# Patient Record
Sex: MALE | Race: WHITE | NOT HISPANIC OR LATINO | Employment: FULL TIME | URBAN - METROPOLITAN AREA
[De-identification: names, ages, dates, MRNs, and addresses within clinical notes are randomized per-mention and may not be internally consistent; named-entity substitution may affect disease eponyms.]

---

## 2023-08-28 ENCOUNTER — OFFICE VISIT (OUTPATIENT)
Dept: PAIN MEDICINE | Facility: CLINIC | Age: 38
End: 2023-08-28
Payer: COMMERCIAL

## 2023-08-28 VITALS
BODY MASS INDEX: 23.4 KG/M2 | HEIGHT: 69 IN | WEIGHT: 158 LBS | HEART RATE: 92 BPM | DIASTOLIC BLOOD PRESSURE: 87 MMHG | SYSTOLIC BLOOD PRESSURE: 129 MMHG

## 2023-08-28 DIAGNOSIS — M54.16 LUMBAR RADICULITIS: ICD-10-CM

## 2023-08-28 DIAGNOSIS — M47.896 OTHER SPONDYLOSIS, LUMBAR REGION: ICD-10-CM

## 2023-08-28 DIAGNOSIS — M51.36 DDD (DEGENERATIVE DISC DISEASE), LUMBAR: ICD-10-CM

## 2023-08-28 DIAGNOSIS — M54.2 CERVICALGIA: Primary | ICD-10-CM

## 2023-08-28 PROCEDURE — 99999 PR PBB SHADOW E&M-NEW PATIENT-LVL IV: ICD-10-PCS | Mod: PBBFAC,,, | Performed by: ANESTHESIOLOGY

## 2023-08-28 PROCEDURE — 99204 OFFICE O/P NEW MOD 45 MIN: CPT | Mod: S$GLB,,, | Performed by: ANESTHESIOLOGY

## 2023-08-28 PROCEDURE — 99999 PR PBB SHADOW E&M-NEW PATIENT-LVL IV: CPT | Mod: PBBFAC,,, | Performed by: ANESTHESIOLOGY

## 2023-08-28 PROCEDURE — 99204 PR OFFICE/OUTPT VISIT, NEW, LEVL IV, 45-59 MIN: ICD-10-PCS | Mod: S$GLB,,, | Performed by: ANESTHESIOLOGY

## 2023-08-28 RX ORDER — DICLOFENAC SODIUM 10 MG/G
GEL TOPICAL
COMMUNITY
Start: 2023-08-21 | End: 2024-01-23 | Stop reason: CLARIF

## 2023-08-28 RX ORDER — ACETAMINOPHEN 500 MG
500 TABLET ORAL
COMMUNITY
End: 2024-01-23 | Stop reason: CLARIF

## 2023-08-28 RX ORDER — DULOXETIN HYDROCHLORIDE 20 MG/1
20 CAPSULE, DELAYED RELEASE ORAL
COMMUNITY
Start: 2022-12-07 | End: 2024-01-23 | Stop reason: CLARIF

## 2023-08-28 RX ORDER — AMOXICILLIN AND CLAVULANATE POTASSIUM 875; 125 MG/1; MG/1
1 TABLET, FILM COATED ORAL EVERY 12 HOURS
COMMUNITY
Start: 2023-08-21 | End: 2024-01-23 | Stop reason: CLARIF

## 2023-08-28 RX ORDER — ALBUTEROL SULFATE 90 UG/1
2 AEROSOL, METERED RESPIRATORY (INHALATION) EVERY 6 HOURS PRN
COMMUNITY
Start: 2022-10-12 | End: 2024-01-23 | Stop reason: CLARIF

## 2023-08-28 RX ORDER — IBUPROFEN 200 MG
1 TABLET ORAL
COMMUNITY
End: 2024-01-23 | Stop reason: CLARIF

## 2023-08-28 RX ORDER — FAMOTIDINE 20 MG/1
20 TABLET, FILM COATED ORAL
COMMUNITY
Start: 2022-12-07 | End: 2024-01-23 | Stop reason: CLARIF

## 2023-08-28 RX ORDER — LORATADINE 10 MG/1
10 TABLET ORAL
COMMUNITY
End: 2024-01-23 | Stop reason: CLARIF

## 2023-08-28 RX ORDER — METHOCARBAMOL 500 MG/1
500 TABLET, FILM COATED ORAL 4 TIMES DAILY
COMMUNITY
End: 2024-01-23 | Stop reason: CLARIF
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
## 2023-09-18 ENCOUNTER — TELEPHONE (OUTPATIENT)
Dept: PAIN MEDICINE | Facility: CLINIC | Age: 38
End: 2023-09-18
Payer: COMMERCIAL

## 2023-09-18 DIAGNOSIS — M51.36 DDD (DEGENERATIVE DISC DISEASE), LUMBAR: Primary | ICD-10-CM

## 2023-09-18 NOTE — TELEPHONE ENCOUNTER
----- Message from Joanna Hutchinson sent at 9/18/2023 11:06 AM CDT -----  Contact: pt wife anthnoy  Type: Needs Medical Advice  Who Called:  pt wife anthony  Best Call Back Number: [phone]  Additional Information: please call anthony has some questions regarding the pt referral

## 2023-09-18 NOTE — TELEPHONE ENCOUNTER
Spoke with pt wife can you please specify location [place] or [facility]  thank you

## 2023-09-29 ENCOUNTER — CLINICAL SUPPORT (OUTPATIENT)
Dept: REHABILITATION | Facility: HOSPITAL | Age: 38
End: 2023-09-29
Attending: ANESTHESIOLOGY
Payer: COMMERCIAL

## 2023-09-29 DIAGNOSIS — M51.36 DDD (DEGENERATIVE DISC DISEASE), LUMBAR: ICD-10-CM

## 2023-09-29 DIAGNOSIS — M54.50 CHRONIC BILATERAL LOW BACK PAIN WITHOUT SCIATICA: ICD-10-CM

## 2023-09-29 DIAGNOSIS — R29.898 IMPAIRED FLEXIBILITY OF LOWER EXTREMITY: ICD-10-CM

## 2023-09-29 DIAGNOSIS — G89.29 CHRONIC BILATERAL LOW BACK PAIN WITHOUT SCIATICA: ICD-10-CM

## 2023-09-29 DIAGNOSIS — M62.81 WEAKNESS OF TRUNK MUSCULATURE: ICD-10-CM

## 2023-09-29 PROCEDURE — 97530 THERAPEUTIC ACTIVITIES: CPT | Mod: PN

## 2023-09-29 PROCEDURE — 97162 PT EVAL MOD COMPLEX 30 MIN: CPT | Mod: PN
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
## 2023-09-30 PROBLEM — M62.81 WEAKNESS OF TRUNK MUSCULATURE: Status: ACTIVE | Noted: 2023-09-30

## 2023-09-30 PROBLEM — G89.29 CHRONIC BILATERAL LOW BACK PAIN WITHOUT SCIATICA: Status: ACTIVE | Noted: 2023-09-30

## 2023-09-30 PROBLEM — M54.50 CHRONIC BILATERAL LOW BACK PAIN WITHOUT SCIATICA: Status: ACTIVE | Noted: 2023-09-30

## 2023-09-30 PROBLEM — R29.898 IMPAIRED FLEXIBILITY OF LOWER EXTREMITY: Status: ACTIVE | Noted: 2023-09-30

## 2023-10-09 ENCOUNTER — OFFICE VISIT (OUTPATIENT)
Dept: PAIN MEDICINE | Facility: CLINIC | Age: 38
End: 2023-10-09
Payer: COMMERCIAL

## 2023-10-09 VITALS
DIASTOLIC BLOOD PRESSURE: 94 MMHG | HEIGHT: 69 IN | BODY MASS INDEX: 23.4 KG/M2 | SYSTOLIC BLOOD PRESSURE: 145 MMHG | HEART RATE: 71 BPM | WEIGHT: 158 LBS

## 2023-10-09 DIAGNOSIS — M54.2 CERVICALGIA: ICD-10-CM

## 2023-10-09 DIAGNOSIS — M54.16 LUMBAR RADICULITIS: ICD-10-CM

## 2023-10-09 DIAGNOSIS — M51.36 DDD (DEGENERATIVE DISC DISEASE), LUMBAR: Primary | ICD-10-CM

## 2023-10-09 DIAGNOSIS — M47.896 OTHER SPONDYLOSIS, LUMBAR REGION: ICD-10-CM

## 2023-10-09 PROCEDURE — 99214 PR OFFICE/OUTPT VISIT, EST, LEVL IV, 30-39 MIN: ICD-10-PCS | Mod: S$GLB,,, | Performed by: PHYSICIAN ASSISTANT

## 2023-10-09 PROCEDURE — 99999 PR PBB SHADOW E&M-EST. PATIENT-LVL III: CPT | Mod: PBBFAC,,, | Performed by: PHYSICIAN ASSISTANT

## 2023-10-09 PROCEDURE — 99999 PR PBB SHADOW E&M-EST. PATIENT-LVL III: ICD-10-PCS | Mod: PBBFAC,,, | Performed by: PHYSICIAN ASSISTANT

## 2023-10-09 PROCEDURE — 99214 OFFICE O/P EST MOD 30 MIN: CPT | Mod: S$GLB,,, | Performed by: PHYSICIAN ASSISTANT

## 2023-10-09 RX ORDER — GABAPENTIN 300 MG/1
300 CAPSULE ORAL 3 TIMES DAILY
Qty: 90 CAPSULE | Refills: 0 | Status: SHIPPED | OUTPATIENT
Start: 2023-10-09 | End: 2023-11-30 | Stop reason: SDUPTHER
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
## 2023-11-30 RX ORDER — GABAPENTIN 300 MG/1
300 CAPSULE ORAL 3 TIMES DAILY
Qty: 90 CAPSULE | Refills: 0 | Status: SHIPPED | OUTPATIENT
Start: 2023-11-30 | End: 2024-01-14 | Stop reason: SDUPTHER

## 2023-11-30 NOTE — TELEPHONE ENCOUNTER
----- Message from Debo Copeland sent at 11/30/2023 12:07 PM CST -----  Contact: Patient wife  Type:  Needs Medical Advice    Who Called: Patient's wifeMakayla     Would the patient rather a call back or a response via [facility]? Call     Best Call Back Number:  [phone]    Additional Information: Patient's wifemakayla would like to speak with the nurse in regards to medication.     Please call to advise

## 2023-12-11 ENCOUNTER — OFFICE VISIT (OUTPATIENT)
Dept: PAIN MEDICINE | Facility: CLINIC | Age: 38
End: 2023-12-11
Payer: COMMERCIAL

## 2023-12-11 ENCOUNTER — HOSPITAL ENCOUNTER (OUTPATIENT)
Dept: RADIOLOGY | Facility: HOSPITAL | Age: 38
Discharge: HOME | End: 2023-12-11
Attending: PHYSICIAN ASSISTANT
Payer: COMMERCIAL

## 2023-12-11 VITALS
WEIGHT: 158 LBS | SYSTOLIC BLOOD PRESSURE: 152 MMHG | HEART RATE: 61 BPM | DIASTOLIC BLOOD PRESSURE: 91 MMHG | BODY MASS INDEX: 23.4 KG/M2 | HEIGHT: 69 IN

## 2023-12-11 DIAGNOSIS — M54.50 CHRONIC MIDLINE LOW BACK PAIN WITHOUT SCIATICA: ICD-10-CM

## 2023-12-11 DIAGNOSIS — M47.896 OTHER SPONDYLOSIS, LUMBAR REGION: ICD-10-CM

## 2023-12-11 DIAGNOSIS — G89.29 CHRONIC MIDLINE LOW BACK PAIN WITHOUT SCIATICA: ICD-10-CM

## 2023-12-11 DIAGNOSIS — Z98.1 HISTORY OF LUMBAR FUSION: ICD-10-CM

## 2023-12-11 DIAGNOSIS — M54.9 DORSALGIA, UNSPECIFIED: ICD-10-CM

## 2023-12-11 DIAGNOSIS — Z98.1 HISTORY OF LUMBAR FUSION: Primary | ICD-10-CM

## 2023-12-11 DIAGNOSIS — M51.36 DDD (DEGENERATIVE DISC DISEASE), LUMBAR: ICD-10-CM

## 2023-12-11 PROCEDURE — 99999 PR PBB SHADOW E&M-EST. PATIENT-LVL IV: CPT | Mod: PBBFAC,,, | Performed by: PHYSICIAN ASSISTANT

## 2023-12-11 PROCEDURE — 99214 OFFICE O/P EST MOD 30 MIN: CPT | Mod: S$GLB,,, | Performed by: PHYSICIAN ASSISTANT

## 2023-12-11 PROCEDURE — 99999 PR PBB SHADOW E&M-EST. PATIENT-LVL IV: ICD-10-PCS | Mod: PBBFAC,,, | Performed by: PHYSICIAN ASSISTANT

## 2023-12-11 PROCEDURE — 72114 X-RAY EXAM L-S SPINE BENDING: CPT | Mod: TC

## 2023-12-11 PROCEDURE — 72114 X-RAY EXAM L-S SPINE BENDING: CPT | Mod: 26,,, | Performed by: RADIOLOGY

## 2023-12-11 PROCEDURE — 72114 XR LUMBAR SPINE 5 VIEW WITH FLEX AND EXT: ICD-10-PCS | Mod: 26,,, | Performed by: RADIOLOGY

## 2023-12-11 PROCEDURE — 99214 PR OFFICE/OUTPT VISIT, EST, LEVL IV, 30-39 MIN: ICD-10-PCS | Mod: S$GLB,,, | Performed by: PHYSICIAN ASSISTANT

## 2023-12-11 RX ORDER — IBUPROFEN 800 MG/1
800 TABLET ORAL 2 TIMES DAILY
Qty: 60 TABLET | Refills: 0 | Status: SHIPPED | OUTPATIENT
Start: 2023-12-11 | End: 2024-01-14 | Stop reason: SDUPTHER
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
## 2023-12-27 ENCOUNTER — HOSPITAL ENCOUNTER (OUTPATIENT)
Dept: RADIOLOGY | Facility: HOSPITAL | Age: 38
Discharge: HOME | End: 2023-12-27
Attending: PHYSICIAN ASSISTANT
Payer: COMMERCIAL

## 2023-12-27 DIAGNOSIS — M54.9 DORSALGIA, UNSPECIFIED: ICD-10-CM

## 2023-12-27 DIAGNOSIS — Z98.1 HISTORY OF LUMBAR FUSION: ICD-10-CM

## 2023-12-27 DIAGNOSIS — M54.50 CHRONIC MIDLINE LOW BACK PAIN WITHOUT SCIATICA: ICD-10-CM

## 2023-12-27 DIAGNOSIS — G89.29 CHRONIC MIDLINE LOW BACK PAIN WITHOUT SCIATICA: ICD-10-CM

## 2023-12-27 PROCEDURE — A9585 GADOBUTROL INJECTION: HCPCS

## 2023-12-27 PROCEDURE — 72158 MRI LUMBAR SPINE W/O & W/DYE: CPT | Mod: TC

## 2023-12-27 PROCEDURE — 72158 MRI LUMBAR SPINE W/O & W/DYE: CPT | Mod: 26,,, | Performed by: RADIOLOGY

## 2023-12-27 PROCEDURE — 25500020 PHARM REV CODE 255

## 2023-12-27 PROCEDURE — 72158 MRI LUMBAR SPINE W WO CONTRAST: ICD-10-PCS | Mod: 26,,, | Performed by: RADIOLOGY

## 2023-12-27 RX ORDER — GADOBUTROL 604.72 MG/ML
INJECTION INTRAVENOUS
Status: COMPLETED
Start: 2023-12-27 | End: 2023-12-27

## 2023-12-27 RX ADMIN — GADOBUTROL 7 ML: 604.72 INJECTION INTRAVENOUS at 07:12

## 2023-12-29 ENCOUNTER — TELEPHONE (OUTPATIENT)
Dept: PAIN MEDICINE | Facility: CLINIC | Age: 38
End: 2023-12-29
Payer: COMMERCIAL

## 2023-12-29 NOTE — TELEPHONE ENCOUNTER
MRI showed narrowing in the spinal canal at multiple levels. Recommend trying SUSAN at L5-S1 to see if we can help his pain

## 2023-12-29 NOTE — TELEPHONE ENCOUNTER
----- Message from Rafaela Burton sent at 12/29/2023  4:15 PM CST -----  Regarding: results  Contact: anthony  Type:  Test Results    Who Called: anthony  Name of Test (Lab/Mammo/Etc): MRI  Date of Test: 12/27/23  Ordering Provider: To  Where the test was performed: [facility]  Would the patient rather a call back or a response via [facility]? Call back  Best Call Back Number: [phone]    Additional Information:  sts the pt would like his results

## 2024-01-02 NOTE — TELEPHONE ENCOUNTER
Felicity tried to contact him last week. Here is his MRI results and my recommendations.   MRI showed narrowing in the spinal canal at multiple levels. Recommend trying SUSAN at L5-S1 to see if we can help his pain

## 2024-01-04 DIAGNOSIS — M54.16 LUMBAR RADICULITIS: Primary | ICD-10-CM

## 2024-01-14 DIAGNOSIS — Z98.1 HISTORY OF LUMBAR FUSION: ICD-10-CM

## 2024-01-16 RX ORDER — IBUPROFEN 800 MG/1
800 TABLET ORAL 2 TIMES DAILY
Qty: 60 TABLET | Refills: 0 | Status: SHIPPED | OUTPATIENT
Start: 2024-01-16 | End: 2024-02-14

## 2024-01-16 RX ORDER — GABAPENTIN 300 MG/1
300 CAPSULE ORAL 3 TIMES DAILY
Qty: 90 CAPSULE | Refills: 0 | Status: SHIPPED | OUTPATIENT
Start: 2024-01-16 | End: 2024-04-15

## 2024-01-25 ENCOUNTER — HOSPITAL ENCOUNTER (OUTPATIENT)
Facility: HOSPITAL | Age: 39
Discharge: HOME | End: 2024-01-25
Attending: ANESTHESIOLOGY | Admitting: ANESTHESIOLOGY
Payer: COMMERCIAL

## 2024-01-25 DIAGNOSIS — M54.16 LUMBAR RADICULITIS: ICD-10-CM

## 2024-01-25 PROCEDURE — 25500020 PHARM REV CODE 255: Performed by: ANESTHESIOLOGY

## 2024-01-25 PROCEDURE — 62323 NJX INTERLAMINAR LMBR/SAC: CPT | Performed by: ANESTHESIOLOGY

## 2024-01-25 PROCEDURE — 25000003 PHARM REV CODE 250: Performed by: ANESTHESIOLOGY

## 2024-01-25 PROCEDURE — A4216 STERILE WATER/SALINE, 10 ML: HCPCS | Performed by: ANESTHESIOLOGY

## 2024-01-25 PROCEDURE — 63600175 PHARM REV CODE 636 W HCPCS: Performed by: ANESTHESIOLOGY

## 2024-01-25 PROCEDURE — 62323 NJX INTERLAMINAR LMBR/SAC: CPT | Mod: ,,, | Performed by: ANESTHESIOLOGY

## 2024-01-25 RX ORDER — LIDOCAINE HYDROCHLORIDE 10 MG/ML
1 INJECTION, SOLUTION EPIDURAL; INFILTRATION; INTRACAUDAL; PERINEURAL ONCE
Status: COMPLETED | OUTPATIENT
Start: 2024-01-25 | End: 2024-01-25

## 2024-01-25 RX ORDER — SODIUM CHLORIDE 9 MG/ML
INJECTION, SOLUTION INTRAMUSCULAR; INTRAVENOUS; SUBCUTANEOUS
Status: DISCONTINUED | OUTPATIENT
Start: 2024-01-25 | End: 2024-01-25 | Stop reason: HOSPADM

## 2024-01-25 RX ORDER — MIDAZOLAM HYDROCHLORIDE 1 MG/ML
INJECTION INTRAMUSCULAR; INTRAVENOUS
Status: DISCONTINUED | OUTPATIENT
Start: 2024-01-25 | End: 2024-01-25 | Stop reason: HOSPADM

## 2024-01-25 RX ORDER — DEXAMETHASONE SODIUM PHOSPHATE 10 MG/ML
INJECTION INTRAMUSCULAR; INTRAVENOUS
Status: DISCONTINUED | OUTPATIENT
Start: 2024-01-25 | End: 2024-01-25 | Stop reason: HOSPADM

## 2024-01-25 RX ORDER — LIDOCAINE HYDROCHLORIDE 10 MG/ML
INJECTION, SOLUTION EPIDURAL; INFILTRATION; INTRACAUDAL; PERINEURAL
Status: DISCONTINUED | OUTPATIENT
Start: 2024-01-25 | End: 2024-01-25 | Stop reason: HOSPADM

## 2024-01-25 RX ORDER — SODIUM CHLORIDE, SODIUM LACTATE, POTASSIUM CHLORIDE, CALCIUM CHLORIDE 600; 310; 30; 20 MG/100ML; MG/100ML; MG/100ML; MG/100ML
INJECTION, SOLUTION INTRAVENOUS CONTINUOUS
Status: ACTIVE | OUTPATIENT
Start: 2024-01-25

## 2024-01-25 RX ORDER — FENTANYL CITRATE 50 UG/ML
INJECTION, SOLUTION INTRAMUSCULAR; INTRAVENOUS
Status: DISCONTINUED | OUTPATIENT
Start: 2024-01-25 | End: 2024-01-25 | Stop reason: HOSPADM

## 2024-01-25 RX ADMIN — LIDOCAINE HYDROCHLORIDE 0.2 ML: 10 INJECTION, SOLUTION EPIDURAL; INFILTRATION; INTRACAUDAL; PERINEURAL at 07:01

## 2024-01-25 RX ADMIN — SODIUM CHLORIDE, POTASSIUM CHLORIDE, SODIUM LACTATE AND CALCIUM CHLORIDE: 600; 310; 30; 20 INJECTION, SOLUTION INTRAVENOUS at 07:01

## 2024-01-25 NOTE — OP NOTE
PROCEDURE DATE: 1/25/2024    Procedure:   Interlaminar epidural steroid injection at L5-S1 under fluoroscopic guidance.    Diagnosis: lUMBAR radiculitis  pOSTOP DIAGNOSIS: sAME    Physician: Valdemar Xiao M.D.    Medications injected:10 mg dexamethasone with 4 ml of preservative free NaCl    Local anesthetic injected:    Lidocaine 1% 2 ml total    Sedation Medications: RN IV sedation    Estimated blood loss:  None    Complications:  None    Technique:  Time-out taken to identify patient and procedure prior to starting the procedure.  With the patient laying in a prone position, the area was prepped and draped in the usual sterile fashion using ChloraPrep and a fenestrated drape.  After determining the target level with an AP fluoroscopic view, local anesthetic was given using a 25-gauge 1.5 inch needle by raising a wheal and then infiltrating toward the interlaminar entry space.  A 3.5inch 20-gauge Touhy needle was introduced under AP fluoroscopic guidance to the interlaminar space of L5-S1. Once the trajectory was established, the needle was visualized in the lateral view and advanced using loss of resistance technique. Once in the desired position, 1ml contrast was injected to confirm placement and there was no vascular uptake nor intrathecal spread.  The medication was then injected slowly. The patient tolerated the procedure well.      The patient was monitored after the procedure.   They were given post-procedure and discharge instructions to follow at home.  The patient was discharged in a stable condition.

## 2024-01-25 NOTE — H&P
"CC: low back pain    HPI: The patient is a 38 y.o. male with a history of low back pain here for SUSAN. There are no major changes in history and physical from 12/1/23 by Jeannette.    Past Medical History:   Diagnosis Date    Allergies     Back pain     General anesthetics causing adverse effect in therapeutic use     pt states last injection, got too much medicine and was hard to wake up    Heavy smoker     Unspecified chronic bronchitis        History reviewed. No pertinent surgical history.    History reviewed. No pertinent family history.    Social History     Socioeconomic History    Marital status:    Tobacco Use    Smoking status: Every Day     Types: Cigarettes       Current Facility-Administered Medications   Medication Dose Route Frequency Provider Last Rate Last Admin    dexAMETHasone sodium phos (PF) injection    PRN Valdemar Xiao MD   10 mg at 01/25/24 0722    iohexoL (OMNIPAQUE 300) injection    PRN Valdemar Xiao MD   5 mL at 01/25/24 0723    lactated ringers infusion   Intravenous Continuous Valdemar Xiao MD 25 mL/hr at 01/25/24 0720 New Bag at 01/25/24 0720    LIDOcaine (PF) 10 mg/ml (1%) injection    PRN Valdemar Xiao MD   5 mL at 01/25/24 0723    sodium chloride 0.9% injection    PRN Valdemar Xiao MD   4 mL at 01/25/24 0724       Review of patient's allergies indicates:   Allergen Reactions    Tramadol Itching and Other (See Comments)     Urinary retention      Bupropion Itching     ITCHING      Cinnamon Other (See Comments)     MAKES AGITATED     Codeine Itching    Levofloxacin Hives    Penicillins Itching     Patient states has taken Keflex without adverse reaction      Quetiapine Other (See Comments)     MAKES AGITATED     Sulfamethoxazole-trimethoprim Hives    Acetaminophen-codeine     Escitalopram Itching       Vitals:    01/22/24 0924 01/25/24 0706   BP:  (!) 150/88   Pulse:  68   Resp:  18   Temp:  98.1 °F (36.7 °C)   TempSrc:  Skin   SpO2:  96%   Weight: 71.7 kg (158 lb)    Height: 5' 9" " (1.753 m)        REVIEW OF SYSTEMS:     GENERAL: No weight loss, malaise or fevers.  HEENT:  No recent changes in vision or hearing  NECK: Negative for lumps, no difficulty with swallowing.  RESPIRATORY: Negative for cough, wheezing or shortness of breath, patient denies any recent URI.  CARDIOVASCULAR: Negative for chest pain, leg swelling or palpitations.  GI: Negative for abdominal discomfort, blood in stools or black stools or change in bowel habits.  MUSCULOSKELETAL: See HPI.  SKIN: Negative for lesions, rash, and itching.  PSYCH: No suicidal or homicidal ideations, no current mood disturbances.  HEMATOLOGY/LYMPHOLOGY: Negative for prolonged bleeding, bruising easily or swollen nodes. Patient is not currently taking any anti-coagulants  ENDO: No history of diabetes or thyroid dysfunction  NEURO: No history of syncope, paralysis, seizures or tremors.All other reviewed and negative other than HPI.    Physical exam:  Gen: A and O x3, pleasant, well-groomed  Skin: No rashes or obvious lesions  HEENT: PERRLA, no obvious deformities on ears or in canals. No thyroid masses, trachea midline, no palpable lymph nodes in neck, axilla.  CVS: Regular rate and rhythm, normal S1 and S2, no murmurs.  Resp: Clear to auscultation bilaterally.  Abdomen: Soft, NT/ND, normal bowel sounds present.  Musculoskeletal/Neuro: Moving all extremities    Assessment:  Lumbar radiculitis  -     Case Request Operating Room: Injection-steroid-epidural-lumbar    Other orders  -     dexAMETHasone sodium phos (PF) injection  -     iohexoL (OMNIPAQUE 300) injection  -     LIDOcaine (PF) 10 mg/ml (1%) injection  -     sodium chloride 0.9% injection  -     FL Fluoro for Pain Management; Standing          PLAN: SUSAN      This patient has been cleared for surgery in an ambulatory surgical facility    ASA 3,  Mallampatti Score 3  No history of anesthetic complications  Plan for RN IV sedation

## 2024-01-25 NOTE — DISCHARGE SUMMARY
[facility]  Discharge Note  Short Stay    Procedure(s) (LRB):  Injection-steroid-epidural-lumbar (N/A)      OUTCOME: Patient tolerated treatment/procedure well without complication and is now ready for discharge.    DISPOSITION: Home or Self Care    FINAL DIAGNOSIS:  <principal problem not specified>    FOLLOWUP: In clinic    DISCHARGE INSTRUCTIONS:    Discharge Procedure Orders   Notify your health care provider if you experience any of the following:  temperature >100.4     Notify your health care provider if you experience any of the following:  severe uncontrolled pain     Notify your health care provider if you experience any of the following:  redness, tenderness, or signs of infection (pain, swelling, redness, odor or green/yellow discharge around incision site)     Activity as tolerated        TIME SPENT ON DISCHARGE:   20 minutes

## 2024-01-25 NOTE — PLAN OF CARE
Discharge instructions given to pt/wife, verbalized understanding.  Refused fluids.  IV removed.  Pain 4/10.  Ambulating out to wife  per RN in no distress.

## 2024-01-26 VITALS
TEMPERATURE: 98 F | BODY MASS INDEX: 23.4 KG/M2 | RESPIRATION RATE: 20 BRPM | SYSTOLIC BLOOD PRESSURE: 136 MMHG | HEART RATE: 58 BPM | OXYGEN SATURATION: 96 % | HEIGHT: 69 IN | WEIGHT: 158 LBS | DIASTOLIC BLOOD PRESSURE: 94 MMHG

## 2024-02-22 ENCOUNTER — TELEPHONE (OUTPATIENT)
Dept: PAIN MEDICINE | Facility: CLINIC | Age: 39
End: 2024-02-22
Payer: COMMERCIAL

## 2024-02-22 ENCOUNTER — OFFICE VISIT (OUTPATIENT)
Dept: PAIN MEDICINE | Facility: CLINIC | Age: 39
End: 2024-02-22
Payer: COMMERCIAL

## 2024-02-22 VITALS
HEART RATE: 77 BPM | HEIGHT: 69 IN | BODY MASS INDEX: 23.4 KG/M2 | WEIGHT: 158 LBS | DIASTOLIC BLOOD PRESSURE: 100 MMHG | SYSTOLIC BLOOD PRESSURE: 149 MMHG

## 2024-02-22 DIAGNOSIS — M54.50 CHRONIC MIDLINE LOW BACK PAIN WITHOUT SCIATICA: ICD-10-CM

## 2024-02-22 DIAGNOSIS — M54.16 LUMBAR RADICULITIS: Primary | ICD-10-CM

## 2024-02-22 DIAGNOSIS — Z98.1 HISTORY OF LUMBAR FUSION: ICD-10-CM

## 2024-02-22 DIAGNOSIS — M51.36 DDD (DEGENERATIVE DISC DISEASE), LUMBAR: ICD-10-CM

## 2024-02-22 DIAGNOSIS — G89.29 CHRONIC MIDLINE LOW BACK PAIN WITHOUT SCIATICA: ICD-10-CM

## 2024-02-22 PROCEDURE — 99999 PR PBB SHADOW E&M-EST. PATIENT-LVL III: CPT | Mod: PBBFAC,,, | Performed by: PHYSICIAN ASSISTANT

## 2024-02-22 PROCEDURE — 99214 OFFICE O/P EST MOD 30 MIN: CPT | Mod: S$GLB,,, | Performed by: PHYSICIAN ASSISTANT

## 2024-02-22 RX ORDER — METHOCARBAMOL 500 MG/1
500 TABLET, FILM COATED ORAL 3 TIMES DAILY PRN
Qty: 45 TABLET | Refills: 0 | Status: SHIPPED | OUTPATIENT
Start: 2024-02-22 | End: 2024-03-22 | Stop reason: SDUPTHER

## 2024-02-22 RX ORDER — GABAPENTIN 300 MG/1
600 CAPSULE ORAL 3 TIMES DAILY
Qty: 180 CAPSULE | Refills: 2 | Status: SHIPPED | OUTPATIENT
Start: 2024-02-22 | End: 2024-05-22

## 2024-02-22 RX ORDER — CELECOXIB 200 MG/1
200 CAPSULE ORAL 2 TIMES DAILY
Qty: 60 CAPSULE | Refills: 2 | Status: SHIPPED | OUTPATIENT
Start: 2024-02-22 | End: 2024-05-22

## 2024-02-22 NOTE — TELEPHONE ENCOUNTER
Called and spoke with the patient's wife and let her know that I did send a message to Felicity about her wanting to change the date of her 's procedure.

## 2024-02-22 NOTE — TELEPHONE ENCOUNTER
Types of orders made on 02/22/2024: Procedure Request      Order Date:2/22/2024   Ordering User:LOAN ROUSSEAU [[id number]]   Encounter Provider:Loan Rousseau PA-C [[id number]]   A   uthorizing Provider: Loan Rousseau PA-C [[id number]]   Supervising Provider:LIBBY ROLON [[id number]]   Type of Supervision:Supervision Required   Department:[facility] PAIN MANAGEMENT[[id number]]      Common Order Information   Procedure -> Epidural Injection (specify level) Cmt: L5-S1      Pre-op Diagnosis -> Lumbar radiculitis      Order Specific Information   Order: Procedure Order to Pain Management [Custom: [id number]]  Order #:             [id number]: 1 FUTURE     Priority: Routine  Class: Clinic Performed     Future Order Information       Expires on:02/22/2025            Expected by:02/22/2024                   Associated Diagnoses       M54.16 Lumbar radiculitis       Physician -> libby rolon          Is patient on anti-coagulants? -> No          Facility Name: -> [facility]          Follow-up: -> 4 weeks              Priority: Routine  Clas   s: Clinic Performed     Future Order Information       Expires on:02/22/2025            Expected by:02/22/2024                   Associated Diagnoses       M54.16 Lumbar radiculitis       Procedure -> Epidural Injection (specify level) Cmt: L5-S1          Physician -> libby rolon

## 2024-02-23 ENCOUNTER — TELEPHONE (OUTPATIENT)
Dept: PAIN MEDICINE | Facility: CLINIC | Age: 39
End: 2024-02-23
Payer: COMMERCIAL

## 2024-02-23 NOTE — TELEPHONE ENCOUNTER
----- Message from Lian Borges MA sent at 2/22/2024  4:26 PM CST -----    ----- Message -----  From: Karoline Rome  Sent: 2/22/2024   4:18 PM CST  To: To [place] Staff    Type: Needs Medical Advice  Who Called:  pt's wife Makayla  Best Call Back Number: [phone]  Additional Information: Makayla is calling in regards to the pt's upcoming injection visit. Pt was supposed to be scheduled for his injection on 03/08. The appt is not showing up on the pt's chart but Makayla says that they need the appt scheduled for 03/15 as she works all day on the 8th and won't be able to bring him that day. Needs to speak to someone in the office. Please call back and advise. Thanks!

## 2024-03-01 ENCOUNTER — TELEPHONE (OUTPATIENT)
Dept: PAIN MEDICINE | Facility: CLINIC | Age: 39
End: 2024-03-01
Payer: COMMERCIAL

## 2024-03-01 NOTE — TELEPHONE ENCOUNTER
----- Message from Gregoria Bailey sent at 3/1/2024 11:03 AM CST -----  Regarding: advice  Type:  Needs Medical Advice    Who Called: pt    Best Call Back Number: [phone]      Additional Information: pt wants to cancel his appt on 03/15.  please call to discuss.

## 2024-03-22 RX ORDER — METHOCARBAMOL 500 MG/1
500 TABLET, FILM COATED ORAL 3 TIMES DAILY PRN
Qty: 45 TABLET | Refills: 0 | Status: SHIPPED | OUTPATIENT
Start: 2024-03-22 | End: 2024-04-06

## 2024-04-24 ENCOUNTER — PATIENT MESSAGE (OUTPATIENT)
Dept: PAIN MEDICINE | Facility: CLINIC | Age: 39
End: 2024-04-24
Payer: COMMERCIAL

## 2024-04-24 RX ORDER — METHOCARBAMOL 500 MG/1
500 TABLET, FILM COATED ORAL 3 TIMES DAILY PRN
Qty: 45 TABLET | Refills: 1 | Status: SHIPPED | OUTPATIENT
Start: 2024-04-24 | End: 2024-05-24

## 2024-04-24 NOTE — TELEPHONE ENCOUNTER
Last seen 02/2024 robaxin not an active med cannot pend, also pt request botox but I do not see you have discussed this option? Please advise.

## 2024-04-24 NOTE — TELEPHONE ENCOUNTER
Robaxin has been sent in. As far as neck treatments we will need to start with imaging likely followed by physical therapy for neck, interventional procedures as indicated, ESIs, MBB before botox would be an option for treatment

## 2024-05-28 ENCOUNTER — PATIENT MESSAGE (OUTPATIENT)
Dept: PAIN MEDICINE | Facility: CLINIC | Age: 39
End: 2024-05-28
Payer: COMMERCIAL

## 2024-05-28 DIAGNOSIS — G89.29 CHRONIC MIDLINE LOW BACK PAIN WITHOUT SCIATICA: ICD-10-CM

## 2024-05-28 DIAGNOSIS — M54.50 CHRONIC MIDLINE LOW BACK PAIN WITHOUT SCIATICA: ICD-10-CM

## 2024-05-28 DIAGNOSIS — M54.2 CERVICALGIA: Primary | ICD-10-CM

## 2024-05-28 NOTE — TELEPHONE ENCOUNTER
Can you please write a blank referral to PT for pts neck pain Ill fax it as applicable thank you.

## 2024-06-06 ENCOUNTER — TELEPHONE (OUTPATIENT)
Dept: PAIN MEDICINE | Facility: CLINIC | Age: 39
End: 2024-06-06
Payer: COMMERCIAL

## 2024-06-06 NOTE — TELEPHONE ENCOUNTER
----- Message from Dior Boateng LPN sent at 6/6/2024 10:16 AM CDT -----    ----- Message -----  From: Andi Carbajal  Sent: 6/6/2024   9:30 AM CDT  To: [facility] Staff    Type: Needs Medical Advice    Who Called:  Anahy from [facility] physical therapy    Best Call Back Number: [phone], option 1    Additional Information: Anahy from [facility] physical therapy calling to check on status of referral and it needing to be signed. Fax: [phone]. Needs it before the end of day today. Please call back to advise, Thanks!

## 2024-06-08 ENCOUNTER — PATIENT MESSAGE (OUTPATIENT)
Dept: PAIN MEDICINE | Facility: CLINIC | Age: 39
End: 2024-06-08
Payer: COMMERCIAL

## 2024-07-10 ENCOUNTER — OFFICE VISIT (OUTPATIENT)
Dept: PAIN MEDICINE | Facility: CLINIC | Age: 39
End: 2024-07-10
Payer: COMMERCIAL

## 2024-07-10 ENCOUNTER — HOSPITAL ENCOUNTER (OUTPATIENT)
Dept: RADIOLOGY | Facility: HOSPITAL | Age: 39
Discharge: HOME | End: 2024-07-10
Attending: PHYSICIAN ASSISTANT
Payer: COMMERCIAL

## 2024-07-10 ENCOUNTER — PATIENT MESSAGE (OUTPATIENT)
Dept: PAIN MEDICINE | Facility: CLINIC | Age: 39
End: 2024-07-10

## 2024-07-10 VITALS
HEIGHT: 69 IN | HEART RATE: 69 BPM | DIASTOLIC BLOOD PRESSURE: 97 MMHG | SYSTOLIC BLOOD PRESSURE: 148 MMHG | BODY MASS INDEX: 23.41 KG/M2 | WEIGHT: 158.06 LBS

## 2024-07-10 DIAGNOSIS — M54.9 MID BACK PAIN: Primary | ICD-10-CM

## 2024-07-10 DIAGNOSIS — M54.2 NECK PAIN: ICD-10-CM

## 2024-07-10 DIAGNOSIS — M54.12 CERVICAL RADICULITIS: ICD-10-CM

## 2024-07-10 DIAGNOSIS — M54.2 NECK PAIN: Primary | ICD-10-CM

## 2024-07-10 DIAGNOSIS — M54.2 CERVICALGIA: ICD-10-CM

## 2024-07-10 DIAGNOSIS — Z98.1 HISTORY OF LUMBAR FUSION: ICD-10-CM

## 2024-07-10 PROCEDURE — 72052 X-RAY EXAM NECK SPINE 6/>VWS: CPT | Mod: TC

## 2024-07-10 PROCEDURE — 72052 X-RAY EXAM NECK SPINE 6/>VWS: CPT | Mod: 26,,, | Performed by: RADIOLOGY

## 2024-07-10 PROCEDURE — 99999 PR PBB SHADOW E&M-EST. PATIENT-LVL III: CPT | Mod: PBBFAC,,, | Performed by: PHYSICIAN ASSISTANT

## 2024-07-10 PROCEDURE — 99214 OFFICE O/P EST MOD 30 MIN: CPT | Mod: S$GLB,,, | Performed by: PHYSICIAN ASSISTANT

## 2024-07-10 RX ORDER — METHOCARBAMOL 500 MG/1
500 TABLET, FILM COATED ORAL 3 TIMES DAILY PRN
Qty: 45 TABLET | Refills: 1 | Status: SHIPPED | OUTPATIENT
Start: 2024-07-10 | End: 2024-08-09

## 2024-07-10 RX ORDER — DULOXETIN HYDROCHLORIDE 30 MG/1
30 CAPSULE, DELAYED RELEASE ORAL DAILY
Qty: 30 CAPSULE | Refills: 1 | Status: SHIPPED | OUTPATIENT
Start: 2024-07-10 | End: 2025-07-10

## 2024-07-10 RX ORDER — GABAPENTIN 300 MG/1
600 CAPSULE ORAL 3 TIMES DAILY
Qty: 180 CAPSULE | Refills: 2 | Status: SHIPPED | OUTPATIENT
Start: 2024-07-10 | End: 2024-10-08
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
## 2024-07-11 NOTE — TELEPHONE ENCOUNTER
There is no indication for a thoracic MRI at this time. I can order an xray if he would like to look at the disc spaces and joints

## 2024-07-30 ENCOUNTER — HOSPITAL ENCOUNTER (OUTPATIENT)
Dept: RADIOLOGY | Facility: HOSPITAL | Age: 39
Discharge: HOME | End: 2024-07-30
Attending: PHYSICIAN ASSISTANT
Payer: COMMERCIAL

## 2024-07-30 ENCOUNTER — TELEPHONE (OUTPATIENT)
Dept: PAIN MEDICINE | Facility: CLINIC | Age: 39
End: 2024-07-30
Payer: COMMERCIAL

## 2024-07-30 DIAGNOSIS — M54.2 CERVICALGIA: ICD-10-CM

## 2024-07-30 DIAGNOSIS — M54.9 MID BACK PAIN: ICD-10-CM

## 2024-07-30 DIAGNOSIS — M54.2 NECK PAIN: Primary | ICD-10-CM

## 2024-07-30 PROCEDURE — 72070 X-RAY EXAM THORAC SPINE 2VWS: CPT | Mod: TC

## 2024-07-30 PROCEDURE — 72141 MRI NECK SPINE W/O DYE: CPT | Mod: 26,,, | Performed by: RADIOLOGY

## 2024-07-30 PROCEDURE — 72070 X-RAY EXAM THORAC SPINE 2VWS: CPT | Mod: 26,,, | Performed by: RADIOLOGY

## 2024-07-30 PROCEDURE — 72141 MRI NECK SPINE W/O DYE: CPT | Mod: TC

## 2024-07-30 NOTE — TELEPHONE ENCOUNTER
Spoke with pt and wife and they are requesting a referral to Dr Deandre Holder for consult please.  Thank you.

## 2024-07-30 NOTE — TELEPHONE ENCOUNTER
No significant findings on MRI. Thoracic MRI-Disc spaces and joints were normal. Lumbar-mild arthritis and mild narrowing at one level. Nothing to explain severe pain or pain down left arm or b/l UE numbness and tingling. We can get a EMG to look at the nerves in his Upper extremities if he would like.

## 2024-07-31 ENCOUNTER — TELEPHONE (OUTPATIENT)
Dept: NEUROSURGERY | Facility: CLINIC | Age: 39
End: 2024-07-31
Payer: COMMERCIAL

## 2024-07-31 ENCOUNTER — PATIENT MESSAGE (OUTPATIENT)
Dept: NEUROSURGERY | Facility: CLINIC | Age: 39
End: 2024-07-31
Payer: COMMERCIAL

## 2024-08-05 RX ORDER — DULOXETIN HYDROCHLORIDE 30 MG/1
30 CAPSULE, DELAYED RELEASE ORAL
Qty: 90 CAPSULE | Refills: 1 | Status: SHIPPED | OUTPATIENT
Start: 2024-08-05

## 2024-08-21 ENCOUNTER — OFFICE VISIT (OUTPATIENT)
Dept: NEUROSURGERY | Facility: CLINIC | Age: 39
End: 2024-08-21
Payer: COMMERCIAL

## 2024-08-21 VITALS
HEIGHT: 69 IN | OXYGEN SATURATION: 100 % | HEART RATE: 82 BPM | SYSTOLIC BLOOD PRESSURE: 154 MMHG | BODY MASS INDEX: 24.29 KG/M2 | DIASTOLIC BLOOD PRESSURE: 90 MMHG | WEIGHT: 164 LBS

## 2024-08-21 DIAGNOSIS — M54.2 NECK PAIN: ICD-10-CM

## 2024-08-21 DIAGNOSIS — M54.12 CERVICAL RADICULOPATHY: ICD-10-CM

## 2024-08-21 DIAGNOSIS — M54.2 NECK PAIN: Primary | ICD-10-CM

## 2024-08-21 PROCEDURE — 99204 OFFICE O/P NEW MOD 45 MIN: CPT | Mod: S$GLB,,, | Performed by: NEUROLOGICAL SURGERY

## 2024-08-21 NOTE — PATIENT INSTRUCTIONS
I have personally reviewed the MRI Cervical Spine Without Contrast  with the pt which shows:  1. Small central disc protrusion/osteophyte complex at C5-C6 mildly narrowing the spinal canal slightly flattening the ventral cord surface.  No cord edema.  2. No other substantial degenerative changes or acute process.    I will refer the pt to Dr. Echols (Pain  management) for a C5/C6 SUSAN for symptomatic pain relief.     I will schedule the pt for a f/u visit 2wks s/p SUSAN with my PA-C, Makayla Calix.
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
## 2024-08-22 ENCOUNTER — TELEPHONE (OUTPATIENT)
Dept: PAIN MEDICINE | Facility: CLINIC | Age: 39
End: 2024-08-22
Payer: COMMERCIAL

## 2024-08-22 DIAGNOSIS — M50.30 DDD (DEGENERATIVE DISC DISEASE), CERVICAL: Primary | ICD-10-CM

## 2024-08-22 DIAGNOSIS — M54.12 CERVICAL RADICULOPATHY: ICD-10-CM

## 2024-08-22 NOTE — TELEPHONE ENCOUNTER
LVM & sent [facility] msg asking pt to contact clinic to discuss scheduling options for the C6-7 SUSAN ref: Ware- phone number included.

## 2024-08-22 NOTE — TELEPHONE ENCOUNTER
----- Message from Ag Echols Jr., MD sent at 2024  1:07 PM CDT -----  Regarding: RE: Order for OLGA FELIX  OK to schedule for C6-7 ILESI. Thanks.  ----- Message -----  From: Kaitlin Blair RN  Sent: 2024  12:51 PM CDT  To: Ag Echols Jr., MD  Subject: FW: Order for OLGA FELXI                      ----- Message -----  From: Sarah Saenz, RN  Sent: 2024  12:44 PM CDT  To: [facility] Pain Management Schedulers  Subject: Order for OLGA FELIX                          Patient Name: OLGA FELIX([id number])  Sex: Male  : 1985      PCP: TACOS, PRIMARY DOCTOR    Center: [facility]     Types of orders made on 2024: Procedure Request    Order Date:2024  Ordering User:SARAH SAENZ [[id number]]  Encounter Provider:Sarah Saenz, RN [[id number]]  Au  thorizing Provider: Deandre Holder MD [[id number]]  Department:[facility] NEUROSURGERY 8TH FLOOR[[id number]]    Common Order Information  Procedure -> Epidural Injection (specify level) Cmt: C5/C6    Order Specific Information  Order: Procedure Order to Pain Management [Custom: [id number]]  Order #:          [id number]: 1 FUTURE    Priority: Routine  Class: Clinic Performed    Future Order Information      Ex  carmel on:2025            Expected by:2024                   Associated Diagnoses      M54.2 Neck pain      M54.12 Cervical radiculopathy      Physician -> voorhies         Facility Name: -> [facility]           Priority: Routine  Class: Clinic Performed    Future Order Information      Expires on:2025            Expected by:2024                   Associated Diagnoses      M54.2 Neck p  ain      M54.12 Cervical radiculopathy      Procedure -> Epidural Injection (specify level) Cmt: C5/C6        Physician -> lidiaorhjhonatan         Facility Name: -> [facility]

## 2024-08-28 ENCOUNTER — TELEPHONE (OUTPATIENT)
Dept: PAIN MEDICINE | Facility: CLINIC | Age: 39
End: 2024-08-28
Payer: COMMERCIAL

## 2024-08-28 NOTE — TELEPHONE ENCOUNTER
Spoke with pt's wife stating I will forward our scheduling nurse a message to reach back out to the pt for scheduling and she would contact them when she is available as she is in procedures today. Pt's wife verbalized understanding.        ----- Message from Sowmya Copeland sent at 8/28/2024  8:18 AM CDT -----  Regarding: WIfe anthony   Type: Patient Returning Call    Who Called: Wife     Who Left Message for Patient: Pt states Kaitlin     Does the patient know what this is regarding?: yes botox inj    Would the patient rather a call back or a response via [facility]?  Call back     Best Call Back Number: .[phone]      Additional Information:     Thank you.

## 2024-08-29 ENCOUNTER — TELEPHONE (OUTPATIENT)
Dept: PAIN MEDICINE | Facility: CLINIC | Age: 39
End: 2024-08-29
Payer: COMMERCIAL

## 2024-08-29 NOTE — TELEPHONE ENCOUNTER
Spoke to pt's wife (Makayla) to schedule C6-7 SUSAN ref: Gallo on 9/18/2024- providers updated.

## 2024-08-29 NOTE — TELEPHONE ENCOUNTER
----- Message from Susana Cook MA sent at 8/28/2024  8:52 AM CDT -----  Regarding: Procedure schedule  Good morning,    Pt's wife called about scheduling the pt's procedure. She stated the pt works around her schedule so it would be best to contact her with scheduling. I let the pt's wife know I will forward you a message and you would give them a call back when you are available.     Thank you,  Susana SALINAS

## 2024-09-03 ENCOUNTER — PATIENT MESSAGE (OUTPATIENT)
Dept: PAIN MEDICINE | Facility: CLINIC | Age: 39
End: 2024-09-03

## 2024-09-03 NOTE — TELEPHONE ENCOUNTER
It cannot be removed. It is calculated based on family history, personal history, age, gender, and any history of psychological disease including depression, ADHD, etc.. It ist not a diagnosis. Many ppl score high based on family history, age, and gender.

## 2024-09-12 ENCOUNTER — TELEPHONE (OUTPATIENT)
Dept: PAIN MEDICINE | Facility: CLINIC | Age: 39
End: 2024-09-12

## 2024-09-12 NOTE — TELEPHONE ENCOUNTER
Upon calling pt's wife to review procedure instructions for 9/18/2024, she stated the procedure will need to be r/s due to transportation.  Wife's next day off will be 9/16/2024, requesting 1st start- checking in at 7:15a.

## 2024-10-11 ENCOUNTER — TELEPHONE (OUTPATIENT)
Dept: PAIN MEDICINE | Facility: CLINIC | Age: 39
End: 2024-10-11

## 2024-10-11 NOTE — TELEPHONE ENCOUNTER
Message left with review of pre-procedure instructions, as well as provided arrival time of 7:15am on 10/16/2024.  Information was also sent via [employer].  Asked that patient call clinic to confirm- phone number included.

## 2025-03-25 ENCOUNTER — TELEPHONE (OUTPATIENT)
Dept: NEUROSURGERY | Facility: CLINIC | Age: 40
End: 2025-03-25

## 2025-03-25 ENCOUNTER — TELEPHONE (OUTPATIENT)
Dept: PAIN MEDICINE | Facility: CLINIC | Age: 40
End: 2025-03-25

## 2025-03-25 DIAGNOSIS — M50.30 DDD (DEGENERATIVE DISC DISEASE), CERVICAL: Primary | ICD-10-CM

## 2025-03-25 DIAGNOSIS — M54.12 CERVICAL RADICULOPATHY: ICD-10-CM

## 2025-03-25 NOTE — TELEPHONE ENCOUNTER
S/w pt's wife regarding f/u visit. Informed them that they would need the SUSAN before they could be f/u with neurosurgery. They stated that they were unable to get the SUSAN late last year due to insurance and would now like to be scheduled. Informed them that I would reach out to their staff for rescheduling SUSAN. She v/u.

## 2025-03-25 NOTE — TELEPHONE ENCOUNTER
----- Message from Ag Echols MD sent at 3/25/2025  1:29 PM CDT -----  Okay to schedule for C6-7 interlaminar epidural steroid injection.  Thank you.  ----- Message -----  From: Kaitlin Blair RN  Sent: 3/25/2025   1:17 PM CDT  To: Ag Echols Jr., MD    This pt was a direct procedure referral, therefore, was not evaluated in clinic and I do not see that he has been seen by NS since 8/2024. Please advise.  ----- Message -----  From: Asher Vu MA  Sent: 3/25/2025  12:46 PM CDT  To: Kinza Covarrubias    Good afternoon, This pt had an SUSAN scheduled for October 2024 but didn't go due to insurance issues. Could you please reach out to the patient to have the SUSAN rescheduled? Once scheduled, could you send a message back so that we could schedule a 2 week post injection follow up? Thank you in advance.

## 2025-03-25 NOTE — TELEPHONE ENCOUNTER
S/w pt's wife to schedule 2 week post SUSAN visit with Makayla. I offered her 6/3 at 7am. Pt verbalized agreement. I also reiterated her to update the pt's insurance information. She vu

## 2025-03-25 NOTE — TELEPHONE ENCOUNTER
----- Message from CAREY Madrigal sent at 3/25/2025  1:40 PM CDT -----  Regarding: C6-7 SUSAN Scheduled  Prakash Alonzo, I spoke with Mr. Villa's wife to schedule the SUSAN and she requested a Friday. I offered her our next available Friday, which is 5/16/2025- she accepted.  Of mention, I did provide her with the financial department's number as the pt does not have Insurance coverage listed in his chart.Thanks, Kaitlin

## 2025-03-25 NOTE — TELEPHONE ENCOUNTER
Spoke with Mr. Villa's wife to schedule C6-7 SUSAN ref: Holder. Offered next available 4/02/2025, she declined requesting a Friday.  Offered 5/16/2025- she accepted.  Update sent to referring provider.    Also, provided pt's wife with financial dept phone number to have insurance coverage added to chart.

## 2025-05-07 ENCOUNTER — TELEPHONE (OUTPATIENT)
Dept: NEUROSURGERY | Facility: CLINIC | Age: 40
End: 2025-05-07
Payer: COMMERCIAL

## 2025-05-08 ENCOUNTER — TELEPHONE (OUTPATIENT)
Dept: PAIN MEDICINE | Facility: CLINIC | Age: 40
End: 2025-05-08
Payer: COMMERCIAL

## 2025-05-08 NOTE — TELEPHONE ENCOUNTER
Reviewed pre-procedure instructions with Mrs. Coleman (pt's wife), as well as provided arrival time of 11:00a for 5/16/25.  All questions answered- verbalized understanding.

## 2025-05-13 ENCOUNTER — TELEPHONE (OUTPATIENT)
Dept: PAIN MEDICINE | Facility: CLINIC | Age: 40
End: 2025-05-13
Payer: COMMERCIAL

## 2025-05-13 NOTE — TELEPHONE ENCOUNTER
----- Message from Med Assistant Cruz sent at 5/12/2025  9:15 AM CDT -----  Regarding: Procedure Reschedule  Good morning sunshine, Please reschedule pt procedure 05/16/25

## 2025-05-13 NOTE — TELEPHONE ENCOUNTER
Spoke with pt's wife to confirm that procedure scheduled 5/16/2025 needs to be r/s due to work.  Requesting a Friday in June- offered 6/13/25 @ 1100a, which she accepted.

## 2025-06-03 ENCOUNTER — OFFICE VISIT (OUTPATIENT)
Dept: NEUROSURGERY | Facility: CLINIC | Age: 40
End: 2025-06-03
Payer: COMMERCIAL

## 2025-06-03 VITALS
BODY MASS INDEX: 24.22 KG/M2 | OXYGEN SATURATION: 99 % | SYSTOLIC BLOOD PRESSURE: 124 MMHG | DIASTOLIC BLOOD PRESSURE: 80 MMHG | HEIGHT: 69 IN | HEART RATE: 82 BPM

## 2025-06-03 DIAGNOSIS — M54.2 NECK PAIN: Primary | ICD-10-CM

## 2025-06-03 DIAGNOSIS — M54.50 CHRONIC BILATERAL LOW BACK PAIN WITHOUT SCIATICA: ICD-10-CM

## 2025-06-03 DIAGNOSIS — G89.29 CHRONIC BILATERAL LOW BACK PAIN WITHOUT SCIATICA: ICD-10-CM

## 2025-06-03 PROCEDURE — 99214 OFFICE O/P EST MOD 30 MIN: CPT | Mod: S$GLB,,, | Performed by: PHYSICIAN ASSISTANT

## 2025-06-05 ENCOUNTER — TELEPHONE (OUTPATIENT)
Dept: PAIN MEDICINE | Facility: CLINIC | Age: 40
End: 2025-06-05
Payer: COMMERCIAL

## 2025-06-12 ENCOUNTER — TELEPHONE (OUTPATIENT)
Dept: PAIN MEDICINE | Facility: CLINIC | Age: 40
End: 2025-06-12
Payer: COMMERCIAL

## 2025-06-12 NOTE — TELEPHONE ENCOUNTER
Attempted to call pt and wife to confirm that they have spoke with the financial dept re: self-pay cost for procedure scheduled tomorrow- no answer or option to LVM.  Msg sent via [facility].

## 2025-06-12 NOTE — TELEPHONE ENCOUNTER
Straight to  x2- lvm informing pt that I was just ensuring they had spoken to someone re: their financial obligation for the procedure.  NO need to return call.

## 2025-06-12 NOTE — TELEPHONE ENCOUNTER
Copied from CRM #[id number]. Topic: General Inquiry - Patient Advice  >> Jun 12, 2025 12:18 PM Summer wrote:  .Type: Patient Call Back    Who called: pt wife    What is the request in detail: pt wife is requesting a call back in regards to slef pay. Pt has paid for procedure that's Friday and want to know why she is still receiving calls about the payment. Please assist.     Can the clinic reply by MICHAELSNER?    Would the patient rather a call back or a response via [facility]? Call     Best call back number:[phone]    Additional Information:

## 2025-06-13 ENCOUNTER — HOSPITAL ENCOUNTER (OUTPATIENT)
Facility: HOSPITAL | Age: 40
Discharge: HOME | End: 2025-06-13
Attending: PAIN MEDICINE | Admitting: PAIN MEDICINE

## 2025-06-13 ENCOUNTER — PATIENT MESSAGE (OUTPATIENT)
Dept: NEUROSURGERY | Facility: CLINIC | Age: 40
End: 2025-06-13
Payer: COMMERCIAL

## 2025-06-13 VITALS
HEART RATE: 59 BPM | SYSTOLIC BLOOD PRESSURE: 130 MMHG | OXYGEN SATURATION: 100 % | TEMPERATURE: 98 F | DIASTOLIC BLOOD PRESSURE: 88 MMHG | RESPIRATION RATE: 15 BRPM

## 2025-06-13 DIAGNOSIS — R20.0 ARM NUMBNESS: Primary | ICD-10-CM

## 2025-06-13 DIAGNOSIS — M54.12 CERVICAL RADICULOPATHY: Primary | ICD-10-CM

## 2025-06-13 PROCEDURE — 25500020 PHARM REV CODE 255: Performed by: PAIN MEDICINE

## 2025-06-13 PROCEDURE — 62321 NJX INTERLAMINAR CRV/THRC: CPT | Performed by: PAIN MEDICINE

## 2025-06-13 PROCEDURE — 63600175 PHARM REV CODE 636 W HCPCS: Performed by: PAIN MEDICINE

## 2025-06-13 RX ORDER — LIDOCAINE HYDROCHLORIDE 10 MG/ML
INJECTION, SOLUTION INFILTRATION; PERINEURAL
Status: DISCONTINUED
Start: 2025-06-13 | End: 2025-06-13 | Stop reason: HOSPADM

## 2025-06-13 RX ORDER — DEXAMETHASONE SODIUM PHOSPHATE 10 MG/ML
INJECTION, SOLUTION INTRA-ARTICULAR; INTRALESIONAL; INTRAMUSCULAR; INTRAVENOUS; SOFT TISSUE
Status: DISCONTINUED
Start: 2025-06-13 | End: 2025-06-13 | Stop reason: HOSPADM

## 2025-06-13 RX ORDER — ALPRAZOLAM 0.5 MG/1
1 TABLET, ORALLY DISINTEGRATING ORAL ONCE AS NEEDED
Status: DISCONTINUED | OUTPATIENT
Start: 2025-06-13 | End: 2025-06-13 | Stop reason: HOSPADM

## 2025-06-13 RX ORDER — LIDOCAINE HYDROCHLORIDE 10 MG/ML
20 INJECTION, SOLUTION INFILTRATION; PERINEURAL ONCE
Status: COMPLETED | OUTPATIENT
Start: 2025-06-13 | End: 2025-06-13

## 2025-06-13 RX ORDER — LIDOCAINE HYDROCHLORIDE 10 MG/ML
INJECTION, SOLUTION EPIDURAL; INFILTRATION; INTRACAUDAL; PERINEURAL
Status: DISCONTINUED
Start: 2025-06-13 | End: 2025-06-13 | Stop reason: HOSPADM

## 2025-06-13 RX ORDER — DEXAMETHASONE SODIUM PHOSPHATE 10 MG/ML
10 INJECTION, SOLUTION INTRA-ARTICULAR; INTRALESIONAL; INTRAMUSCULAR; INTRAVENOUS; SOFT TISSUE ONCE
Status: COMPLETED | OUTPATIENT
Start: 2025-06-13 | End: 2025-06-13

## 2025-06-13 RX ORDER — LIDOCAINE HYDROCHLORIDE 10 MG/ML
1 INJECTION, SOLUTION EPIDURAL; INFILTRATION; INTRACAUDAL; PERINEURAL ONCE
Status: COMPLETED | OUTPATIENT
Start: 2025-06-13 | End: 2025-06-13

## 2025-06-13 NOTE — PLAN OF CARE
Pt alert  pain 2/10 and tolerable. Pt and spouse vb understanding  of all instuctions and will call with concerns.pt able to walk out wo diff

## 2025-06-13 NOTE — DISCHARGE INSTRUCTIONS

## 2025-06-13 NOTE — OP NOTE
Cervical Interlaminar Epidural Steroid Injection under fluoroscopic guidance.  Time-out taken to identify patient and procedure side prior to starting the procedure.   I attest that I have reviewed the patient's home medications prior to the procedure and no contraindication have been identified. I re-evaluated the patient after the patient was positioned for the procedure in the procedure room immediately before the procedural time-out. The vital signs are current and represent the current state of the patient which has not significantly changed since the preprocedure assessment.                                                              Date of Service: 06/13/2025    PCP: Megan, Primary Doctor    Referring Physician:    Procedure: C6-7 cervical interlaminar epidural steroid injection under fluoroscopy.    Reasons for procedure: DDD (degenerative disc disease), cervical [M50.30]  Cervical radiculopathy [M54.12]    Physician: Ag Echols MD    ASSISTANTS: None    Medications injected:  Preservative-free dexamethasone 10mg and Xylocaine 1% MPF 1ml.  This was followed by a slow injection of 4 mL sterile, preservative-free normal saline.    Local anesthetic used: Xylocaine 1% 2ml.     Sedation Medications: None    Complications:  none.    Estimated blood loss: none.    Technique:  With the patient laying in a prone position with the neck in a flexed forward position, the area was prepped and draped in the usual sterile fashion using ChloraPrep and a fenestrated drape.  The area was determined under fluoroscopic guidance.  Local anesthetic was given using a 27-gauge needle by raising a wheal and going down to the osseous elements of the cervical spine.  A 3.5 inch 20-gauge Touhy needle was introduced under fluoroscopic guidance to meet the lamina of C7.  The needle was then hinged under the lamina then advanced using loss of resistance technique.  Once the tip of the needle was in the desired position, the contrast  dye Omnipaque was injected to determine placement and no vascular runoff.  Digital subtraction was employed to confirm that there is no vascular runoff.  The steroid was then injected slowly followed by a slow injection of the 4 mL of the sterile preservative-free normal saline.  The patient tolerated the procedure well.    Pain before the procedure: 5/10    Pain after the procedure: 2/10    The patient was monitored after the procedure and was given post-procedure and discharge instructions to follow at home. The patient was discharged in a stable condition

## 2025-06-13 NOTE — DISCHARGE SUMMARY
[facility] - Pain Management  Discharge Note  Short Stay    Procedure(s) (LRB):  C6-7 Epidural Steroid Injection (ref: Ware) (N/A)      OUTCOME: Patient tolerated treatment/procedure well without complication and is now ready for discharge.    DISPOSITION: Home or Self Care    FINAL DIAGNOSIS:  <principal problem not specified>    FOLLOWUP: In clinic    DISCHARGE INSTRUCTIONS:  No discharge procedures on file.       Discharge Diagnosis:DDD (degenerative disc disease), cervical [M50.30]  Cervical radiculopathy [M54.12]  Condition on Discharge: Stable.  Diet on Discharge: Same as before.  Activity: as per instruction sheet.  Discharge to: Home with a responsible adult.  Follow up: as per Discharge instructions

## 2025-07-21 ENCOUNTER — PROCEDURE VISIT (OUTPATIENT)
Dept: NEUROLOGY | Facility: CLINIC | Age: 40
End: 2025-07-21
Payer: COMMERCIAL

## 2025-07-21 DIAGNOSIS — R20.0 ARM NUMBNESS: ICD-10-CM

## 2025-07-21 PROCEDURE — 95911 NRV CNDJ TEST 9-10 STUDIES: CPT | Mod: S$GLB,,, | Performed by: PHYSICAL MEDICINE & REHABILITATION

## 2025-07-21 PROCEDURE — 95886 MUSC TEST DONE W/N TEST COMP: CPT | Mod: S$GLB,,, | Performed by: PHYSICAL MEDICINE & REHABILITATION

## 2025-07-21 NOTE — PROCEDURES
Test Date:  2025    Patient: arjun coleman : 1985 Physician: Sterling Cisse D.O.   ID#: [id number] Sex: Male Ref. Phys: John Calixlaura KAUFMAN, *     HPI: Arjun Coleman is a 40 y.o.male who presents for NCS/EMG to evaluate for cervical radiculopathy bilaterally.     PROCEDURE:  Prior to the procedure, the procedure was discussed in detail with the patient.  All questions were answered, and verbal consent was obtained.  For nerve conduction studies, a combination of surface electrodes, bar electrodes, and/or ring electrodes were used as needed.  For needle EMG, each site was cleaned and prepped in usual fashion with an alcohol pad.  A monopolar needle (28G) was used.  There was no significant bleeding, and bandages were applied as needed.  The procedure was tolerated without adverse effect.  The patient was instructed on post-procedure care including ice if needed for 10-15 minutes up to 4 times/day for any sore muscles.  I discussed with the patient that the data would be reviewed and a report sent to the referring provider, where any follow up questions regarding next steps should be directed.        NCV & EMG Findings:  Evaluation of the right Median-Radial (Dig I) sensory nerve showed abnormal peak latency difference ((Median Wrist-Dig I)-(Radial Wrist)).  All remaining nerves (as indicated in the following tables) were within normal limits.  All examined muscles (as indicated in the following table) showed no evidence of electrical instability.      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 Carpal tunnel syndrome).  There is no motor axonal loss.  This is graded as Mild in severity on the right.    There is no definitive evidence of a cervical radiculopathy on today's electrophysiologic study.  Please note that there are a few caveats to consider with cervical radiculopathy as it relates to NCS/EMG testing.  For cervical  radiculopathy, EMG does not evaluate radiculopathy cephalad to C5 well.  Also, NCS is often normal in radiculopathy, so we rely on the needle EMG for diagnosis.  The needle EMG will also be normal in purely demyelinating radiculopathy lesions, in predominant sensory nerve root/dorsal root lesions, and in some cases hyperacute lesions.  In these cases, the EMG/NCS will be normal, and can miss radiculopathy.  Sensitivity for needle EMG is estimated to be between 50%-71% for cervical radiculopathy, so NCS/EMG shouldn't be used to definitively rule out radiculopathy, especially if there is a reasonable clinical suspicion.        ___________________________  Sterling Cisse D.O.        NCS+  Motor Nerve Results      Latency Amplitude F-Lat Segment Distance CV Comment   Site (ms) Norm (mV) Norm (ms)  (cm) (m/s) Norm    Left Median (APB)   Wrist 4.0  < 4.6 6.9  > 4.2         Elbow 8.1 - 6.8 -  Elbow-Wrist 24 59  > 47    Right Median (APB)   Wrist 4.5  < 4.6 5.5  > 4.2         Elbow 8.5 - 5.4 -  Elbow-Wrist 23 59  > 47    Left Ulnar (ADM)   Wrist 3.1  < 3.7 10.5  > 3.0         Bel Elbow 7.0 - 10.6 -  Bel Elbow-Wrist 22 56  > 52    Abv Elbow 8.5 - 10.8 -  Abv Elbow-Bel Elbow 9 61  > 43    Right Ulnar (ADM)   Wrist 3.3  < 3.7 11.7  > 3.0         Bel Elbow 6.7 - 11.9 -  Bel Elbow-Wrist 22 65  > 52    Abv Elbow 8.2 - 11.8 -  Abv Elbow-Bel Elbow 9 59  > 43      Sensory Sites      Latency (O) Latency (P) Amp (P-T) Segment Distance Velocity Comment   Site (ms) Norm (ms) Norm (µV) Norm  (cm) (m/s)    Left Median (Rec:Dig II)   Wrist 3.0  < 3.3 3.8  < 4.0 46  > 19 Wrist-Dig II 14 47    Right Median   Wrist-Dig I 2.8 - 3.7 - 41 - Wrist-Dig I 10 36    Wrist-Dig II 3.2  < 3.3 4.0  < 4.0 34  > 19 Wrist-Dig II 14 43    Palm-Wrist 1.85 - 2.4 - 34 - Palm-Wrist - -    Left Ulnar (Rec:Dig V)   Wrist 2.8  < 3.1 3.5  < 4.0 26  > 13 Wrist-Dig V 14 50    Right Ulnar   Wrist-Dig V 2.5  < 3.1 3.5  < 4.0 33  > 13 Wrist-Dig V 14 55    Palm-Wrist  1.95 - 2.5 - 7 - Palm-Wrist - -    Right Radial (Rec:Dig I)   Wrist 2.2 - 2.9 - 7 - Wrist-Dig I 10 45      Inter-Nerve Comparisons     Nerve 1 Value 1 Nerve 2 Value 2 Parameter Result Normal   Sensory Sites   R Median Wrist-Dig I 3.7 ms R Radial Wrist-Dig I 2.9 ms Peak Lat Diff *0.80 ms <0.40   R Median Palm-Wrist 2.4 ms R Ulnar Palm-Wrist 2.5 ms Peak Lat Diff 0.10 ms <0.30     EMG+     Side Muscle Nerve Root Ins Act Fibs Psw Amp Dur Poly Recrt Int Pat Comment   Right Deltoid Axillary C5-C6 Nml Nml Nml Nml Nml 0 Nml Nml    Right Biceps Musculocut C5-C6 Nml Nml Nml Nml Nml 0 Nml Nml    Right Triceps Radial C6-C8 Nml Nml Nml Nml Nml 0 Nml Nml    Right Pronator Teres Median C6-C7 Nml Nml Nml Nml Nml 0 Nml Nml    Right FDI Ulnar,  Radial C8-T1 Nml Nml Nml Nml Nml 0 Nml Nml    Left Deltoid Axillary C5-C6 Nml Nml Nml Nml Nml 0 Nml Nml    Left Biceps Musculocut C5-C6 Nml Nml Nml Nml Nml 0 Nml Nml    Left Triceps Radial C6-C8 Nml Nml Nml Nml Nml 0 Nml Nml    Left Pronator Teres Median C6-C7 Nml Nml Nml Nml Nml 0 Nml Nml    Left FDI Ulnar,  Radial C8-T1 Nml Nml Nml Nml Nml 0 Nml Nml    Left Cervical Parasp (Lower) Rami C7-C8 Nml Nml Nml         Right Cervical Parasp (Lower) Rami C7-C8 Nml Nml Nml                 Waveforms:    Motor              Sensory

## 2025-07-28 ENCOUNTER — RESULTS FOLLOW-UP (OUTPATIENT)
Dept: NEUROSURGERY | Facility: HOSPITAL | Age: 40
End: 2025-07-28
Payer: COMMERCIAL

## 2025-08-12 ENCOUNTER — OFFICE VISIT (OUTPATIENT)
Dept: NEUROSURGERY | Facility: CLINIC | Age: 40
End: 2025-08-12
Payer: COMMERCIAL

## 2025-08-12 VITALS
SYSTOLIC BLOOD PRESSURE: 138 MMHG | WEIGHT: 150.56 LBS | DIASTOLIC BLOOD PRESSURE: 87 MMHG | HEART RATE: 70 BPM | BODY MASS INDEX: 22.3 KG/M2 | OXYGEN SATURATION: 100 % | HEIGHT: 69 IN

## 2025-08-12 DIAGNOSIS — Z98.1 S/P LUMBAR FUSION: ICD-10-CM

## 2025-08-12 DIAGNOSIS — Z98.1 HISTORY OF LUMBAR FUSION: ICD-10-CM

## 2025-08-12 DIAGNOSIS — M54.50 CHRONIC BILATERAL LOW BACK PAIN WITHOUT SCIATICA: ICD-10-CM

## 2025-08-12 DIAGNOSIS — G89.29 CHRONIC BILATERAL LOW BACK PAIN WITHOUT SCIATICA: ICD-10-CM

## 2025-08-12 DIAGNOSIS — M54.2 NECK PAIN: Primary | ICD-10-CM

## 2025-08-12 PROCEDURE — 99213 OFFICE O/P EST LOW 20 MIN: CPT | Mod: S$GLB,,, | Performed by: PHYSICIAN ASSISTANT

## 2025-08-12 RX ORDER — METHOCARBAMOL 750 MG/1
750 TABLET, FILM COATED ORAL 3 TIMES DAILY PRN
Qty: 60 TABLET | Refills: 0 | Status: SHIPPED | OUTPATIENT
Start: 2025-08-12

## 2025-08-12 RX ORDER — IBUPROFEN 800 MG/1
800 TABLET, FILM COATED ORAL EVERY 8 HOURS PRN
Qty: 60 TABLET | Refills: 0 | Status: SHIPPED | OUTPATIENT
Start: 2025-08-12

## 2025-08-26 ENCOUNTER — OFFICE VISIT (OUTPATIENT)
Dept: PAIN MEDICINE | Facility: CLINIC | Age: 40
End: 2025-08-26
Payer: COMMERCIAL

## 2025-08-26 VITALS
WEIGHT: 146.19 LBS | RESPIRATION RATE: 18 BRPM | DIASTOLIC BLOOD PRESSURE: 92 MMHG | OXYGEN SATURATION: 100 % | HEART RATE: 77 BPM | SYSTOLIC BLOOD PRESSURE: 134 MMHG | BODY MASS INDEX: 21.58 KG/M2

## 2025-08-26 DIAGNOSIS — M51.362 DEGENERATION OF INTERVERTEBRAL DISC OF LUMBAR REGION WITH DISCOGENIC BACK PAIN AND LOWER EXTREMITY PAIN: Primary | ICD-10-CM

## 2025-08-26 DIAGNOSIS — M54.16 LUMBAR RADICULOPATHY: ICD-10-CM

## 2025-08-26 DIAGNOSIS — M47.816 LUMBAR SPONDYLOSIS: ICD-10-CM

## 2025-08-26 PROCEDURE — 99999 PR PBB SHADOW E&M-EST. PATIENT-LVL III: CPT | Mod: PBBFAC,,, | Performed by: PAIN MEDICINE

## 2025-08-26 RX ORDER — PREGABALIN 75 MG/1
75 CAPSULE ORAL 2 TIMES DAILY
Qty: 60 CAPSULE | Refills: 5 | Status: SHIPPED | OUTPATIENT
Start: 2025-08-26 | End: 2026-02-22

## 2025-08-28 ENCOUNTER — TELEPHONE (OUTPATIENT)
Dept: PAIN MEDICINE | Facility: CLINIC | Age: 40
End: 2025-08-28
Payer: COMMERCIAL

## (undated) DEVICE — CHLORAPREP 10.5 ML APPLICATOR

## (undated) DEVICE — NDL TUOHY EPIDURAL 20G X 3.5

## (undated) DEVICE — GLOVE SENSICARE PI ALOE 7.5

## (undated) DEVICE — SYS LABEL CORRECT MED

## (undated) DEVICE — TUBING MINIBORE EXTENSION

## (undated) DEVICE — SYR GLASS 5CC LUER LOK